# Patient Record
Sex: MALE | Race: WHITE | NOT HISPANIC OR LATINO | Employment: OTHER | URBAN - METROPOLITAN AREA
[De-identification: names, ages, dates, MRNs, and addresses within clinical notes are randomized per-mention and may not be internally consistent; named-entity substitution may affect disease eponyms.]

---
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
## 2024-02-27 ENCOUNTER — OFFICE VISIT (OUTPATIENT)
Dept: UROLOGY | Facility: CLINIC | Age: 44
End: 2024-02-27
Payer: MEDICARE

## 2024-02-27 ENCOUNTER — TELEPHONE (OUTPATIENT)
Dept: UROLOGY | Facility: CLINIC | Age: 44
End: 2024-02-27

## 2024-02-27 VITALS
SYSTOLIC BLOOD PRESSURE: 114 MMHG | WEIGHT: 313.6 LBS | HEIGHT: 73 IN | BODY MASS INDEX: 41.56 KG/M2 | HEART RATE: 80 BPM | DIASTOLIC BLOOD PRESSURE: 68 MMHG

## 2024-02-27 DIAGNOSIS — R31.0 GROSS HEMATURIA: Primary | ICD-10-CM

## 2024-02-27 DIAGNOSIS — N45.3 EPIDIDYMOORCHITIS: ICD-10-CM

## 2024-02-27 PROCEDURE — 87086 URINE CULTURE/COLONY COUNT: CPT | Performed by: NURSE PRACTITIONER

## 2024-02-27 RX ORDER — DULOXETIN HYDROCHLORIDE 30 MG/1
30 CAPSULE, DELAYED RELEASE ORAL DAILY
COMMUNITY

## 2024-02-27 RX ORDER — LEVOFLOXACIN 500 MG/1
500 TABLET, FILM COATED ORAL DAILY
Qty: 14 TABLET | Refills: 0 | Status: SHIPPED | OUTPATIENT
Start: 2024-02-27 | End: 2024-03-12

## 2024-02-27 RX ORDER — ALBUTEROL SULFATE 90 UG/1
2 AEROSOL, METERED RESPIRATORY (INHALATION) EVERY 4 HOURS PRN
COMMUNITY

## 2024-02-27 RX ORDER — FLUTICASONE PROPIONATE AND SALMETEROL XINAFOATE 115; 21 UG/1; UG/1
2 AEROSOL, METERED RESPIRATORY (INHALATION)
COMMUNITY
Start: 2024-01-23 | End: 2025-01-23

## 2024-02-27 NOTE — TELEPHONE ENCOUNTER
Caller: CHEPELOBITO    Relationship: Emergency Contact    Best call back number: [phone]    What is the medical concern/diagnosis: BLADDER CANCER    What specialty or service is being requested: UROLOGY    What is the provider, practice or medical service name: [facility]    What is the office location: [address] [place] [address] [address] [place], [place]    What is the office FAX number: [phone]    Any additional details: CALLED IN EARLIER TO GET REFERRAL AT ANOTHER OFFICE BUT THEY CANNOT GET THEM IN FOR A FEW MONTHS, WHILE THIS OFFICE CAN GET THEM IN WITHIN A FEW DAYS      WOULD LIKE A CALL BACK, OK TO LEAVE A VM

## 2024-02-28 ENCOUNTER — TELEPHONE (OUTPATIENT)
Dept: UROLOGY | Facility: CLINIC | Age: 44
End: 2024-02-28
Payer: MEDICARE

## 2024-02-28 LAB — BACTERIA SPEC AEROBE CULT: NO GROWTH

## 2024-02-28 NOTE — TELEPHONE ENCOUNTER
----- Message from MATEUS Ahuja sent at 2/28/2024  1:13 PM EST -----  Please advise patient that his urine culture showed no growth.

## 2024-03-01 ENCOUNTER — TELEPHONE (OUTPATIENT)
Dept: UROLOGY | Facility: CLINIC | Age: 44
End: 2024-03-01
Payer: MEDICARE

## 2024-03-01 NOTE — TELEPHONE ENCOUNTER
Spoke to patient about getting an appt for [facility].  Any day but the 7th.  I will call and get his appt and then call him back.

## 2024-03-01 NOTE — TELEPHONE ENCOUNTER
Called pt and let him know that his appt has been scheduled on 3/6/24 @ 1030 at [facility], pt verbalized understanding.

## 2024-03-01 NOTE — TELEPHONE ENCOUNTER
Left message for [facility] to call me back to get patient scheduled.  Any day but the 7th of March.

## 2025-08-28 ENCOUNTER — TRANSCRIBE ORDERS (OUTPATIENT)
Dept: ADMINISTRATIVE | Facility: HOSPITAL | Age: 45
End: 2025-08-28
Payer: MEDICARE

## 2025-08-28 DIAGNOSIS — M96.1 POST LAMINECTOMY SYNDROME: Primary | ICD-10-CM
